# Patient Record
Sex: MALE | Race: WHITE | Employment: OTHER | URBAN - METROPOLITAN AREA
[De-identification: names, ages, dates, MRNs, and addresses within clinical notes are randomized per-mention and may not be internally consistent; named-entity substitution may affect disease eponyms.]

---

## 2019-06-09 RX ORDER — SODIUM CHLORIDE 0.9 % (FLUSH) 0.9 %
10 SYRINGE (ML) INJECTION EVERY 12 HOURS SCHEDULED
Status: CANCELLED | OUTPATIENT
Start: 2019-06-09

## 2019-06-09 RX ORDER — SODIUM CHLORIDE 9 MG/ML
INJECTION, SOLUTION INTRAVENOUS CONTINUOUS
Status: CANCELLED | OUTPATIENT
Start: 2019-06-09

## 2019-06-09 RX ORDER — SODIUM CHLORIDE 0.9 % (FLUSH) 0.9 %
10 SYRINGE (ML) INJECTION PRN
Status: CANCELLED | OUTPATIENT
Start: 2019-06-09

## 2019-06-10 NOTE — H&P
[address]                              HISTORY AND PHYSICAL    PATIENT NAME: Shannan Nearing                   :        1950  MED REC NO:   [id number]                            ROOM:  ACCOUNT NO:   [de-identified]                           ADMIT DATE: 2019  PROVIDER:     Moe Short    HISTORY OF PRESENT ILLNESS:  This patient came into the office. He had  a right inguinal hernia repair. He has a recurrent hernia. There is a  mesh in there. We will have to probably remove the current mesh and put  a new one in. This patient has a history of having had TIAs, so he is  on Plavix. He will be off of that for 5 days prior to surgery. He also  takes clonidine and he has a history of seizures. He is on Depakote. He is on Suboxone because he has a previous history of drug abuse. Dr. Javier Conroy is his doctor and he has okayed him to stay on Suboxone until  surgery. He will manage the patient for that and his other issues after  surgery. His mental status is good. His heart and lungs are clear. His abdomen is clear. He has got a bulging right inguinal hernia, it is  reducible. HEENT is normal.  Skin is normal.  He is going to be given  Ancef 2 gm. A Joyner catheter will be inserted and Anesthesia has  already been advised of this patient to be on a Suboxone and he will  okay him staying on it and they will take care of him intraoperatively. ALLERGIES:  He is allergic to TRAMADOL. LABORATORY DATA:  His labs are all okay that have been done already at  [facility], where I was supposed to do the surgery on  2019, and it was canceled for anesthetic reasons of that hospital.  He is back to [facility] [place] and no other major issues with his family or  major allergies except for the TRAMADOL. PHYSICAL EXAMINATION:  His heart and lungs are clear to auscultation.    His heart is synchronous with radial pulse rhythmic and regular. He has  no neurologic other than the history of seizures, cardiac, vascular, or  neurologic issues. He does not smoke. His abdomen has a hernia that is  reducible. Testicles have shown to be normal on preoperative ultrasound of the  scrotum; however, he does know that he may lose the right testicle as a  result of this recurrent surgery and is willing to accept that. He has  been informed of the risks and benefits of the surgery and the chance of  recurrence is higher than it was before and he is willing to undergo the  surgery and signed the consent in the office.         Argelia Story    D: 06/09/2019 10:58:42       T: 06/09/2019 13:47:00     [email or web address]  Job#: [id number]     Doc#: [id number]    CC:

## 2019-06-11 NOTE — PROGRESS NOTES
[facility]                                                                                                                    PRE OP INSTRUCTIONS FOR  Goyo Arenas        Date: 6/11/2019    Date of surgery: 6/12/19   Arrival Time: Hospital will call you between 5pm and 7pm with your final arrival time for surgery    1. Do not eat or drink anything after midnight prior to surgery. This includes no water, chewing gum, mints or ice chips. 2. Take the following medications with a small sip of water on the morning of Surgery:  Atenolol, Depakote, Clonidine, Suboxone     3. Diabetics may take evening dose of insulin but none after midnight. If you feel symptomatic or low blood sugar morning of surgery drink 1-2 ounces of apple juice only. 4. Aspirin, Ibuprofen, Advil, Naproxen, Vitamin E and other Anti-inflammatory products should be stopped  before surgery  as directed by your physician. Take Tylenol only unless instructed otherwise by your surgeon. 5. Check with your Doctor regarding stopping Plavix, Coumadin, Lovenox, Eliquis, Effient, or other blood thinners. 6. Do not smoke,use illicit drugs and do not drink any alcoholic beverages 24 hours prior to surgery. 7. You may brush your teeth the morning of surgery. DO NOT SWALLOW WATER    8. You MUST make arrangements for a responsible adult to take you home after your surgery. You will not be allowed to leave alone or drive yourself home. It is strongly suggested someone stay with you the first 24 hrs. Your surgery will be cancelled if you do not have a ride home. 9. PEDIATRIC PATIENTS ONLY:  A parent/legal guardian must accompany a child scheduled for surgery and plan to stay at the hospital until the child is discharged. Please do not bring other children with you.     10. Please wear simple, loose fitting clothing to the hospital.  Do not bring valuables (money, credit cards, checkbooks, etc.) Do not wear any makeup

## 2019-06-12 ENCOUNTER — HOSPITAL ENCOUNTER (OUTPATIENT)
Age: 69
Setting detail: OUTPATIENT SURGERY
Discharge: HOME | End: 2019-06-12
Attending: SURGERY | Admitting: SURGERY
Payer: MEDICARE

## 2019-06-12 ENCOUNTER — ANESTHESIA EVENT (OUTPATIENT)
Dept: OPERATING ROOM | Age: 69
End: 2019-06-12
Payer: MEDICARE

## 2019-06-12 ENCOUNTER — ANESTHESIA (OUTPATIENT)
Dept: OPERATING ROOM | Age: 69
End: 2019-06-12
Payer: MEDICARE

## 2019-06-12 VITALS
TEMPERATURE: 98.8 F | SYSTOLIC BLOOD PRESSURE: 120 MMHG | RESPIRATION RATE: 2 BRPM | DIASTOLIC BLOOD PRESSURE: 62 MMHG | OXYGEN SATURATION: 99 %

## 2019-06-12 VITALS
RESPIRATION RATE: 16 BRPM | WEIGHT: 137 LBS | OXYGEN SATURATION: 98 % | SYSTOLIC BLOOD PRESSURE: 198 MMHG | HEIGHT: 65 IN | TEMPERATURE: 97.2 F | HEART RATE: 67 BPM | DIASTOLIC BLOOD PRESSURE: 81 MMHG | BODY MASS INDEX: 22.82 KG/M2

## 2019-06-12 DIAGNOSIS — R56.9 SEIZURE (HCC): Primary | ICD-10-CM

## 2019-06-12 DIAGNOSIS — F19.11 HISTORY OF DRUG ABUSE (HCC): ICD-10-CM

## 2019-06-12 LAB
AMPHETAMINE SCREEN, URINE: NOT DETECTED
BARBITURATE SCREEN URINE: NOT DETECTED
BENZODIAZEPINE SCREEN, URINE: NOT DETECTED
CANNABINOID SCREEN URINE: POSITIVE
COCAINE METABOLITE SCREEN URINE: NOT DETECTED
METHADONE SCREEN, URINE: NOT DETECTED
OPIATE SCREEN URINE: NOT DETECTED
PHENCYCLIDINE SCREEN URINE: NOT DETECTED
PROPOXYPHENE SCREEN: NOT DETECTED

## 2019-06-12 PROCEDURE — 6360000002 HC RX W HCPCS: Performed by: NURSE ANESTHETIST, CERTIFIED REGISTERED

## 2019-06-12 PROCEDURE — 80307 DRUG TEST PRSMV CHEM ANLYZR: CPT

## 2019-06-12 PROCEDURE — 2580000003 HC RX 258: Performed by: ANESTHESIOLOGY

## 2019-06-12 PROCEDURE — C1781 MESH (IMPLANTABLE): HCPCS | Performed by: SURGERY

## 2019-06-12 PROCEDURE — G0480 DRUG TEST DEF 1-7 CLASSES: HCPCS

## 2019-06-12 PROCEDURE — 6360000002 HC RX W HCPCS

## 2019-06-12 PROCEDURE — 64488 TAP BLOCK BI INJECTION: CPT | Performed by: ANESTHESIOLOGY

## 2019-06-12 PROCEDURE — 2500000003 HC RX 250 WO HCPCS: Performed by: SURGERY

## 2019-06-12 PROCEDURE — 7100000011 HC PHASE II RECOVERY - ADDTL 15 MIN: Performed by: SURGERY

## 2019-06-12 PROCEDURE — 6360000002 HC RX W HCPCS: Performed by: SURGERY

## 2019-06-12 PROCEDURE — 7100000001 HC PACU RECOVERY - ADDTL 15 MIN: Performed by: SURGERY

## 2019-06-12 PROCEDURE — 88302 TISSUE EXAM BY PATHOLOGIST: CPT

## 2019-06-12 PROCEDURE — 2500000003 HC RX 250 WO HCPCS: Performed by: NURSE ANESTHETIST, CERTIFIED REGISTERED

## 2019-06-12 PROCEDURE — 3700000000 HC ANESTHESIA ATTENDED CARE: Performed by: SURGERY

## 2019-06-12 PROCEDURE — 88304 TISSUE EXAM BY PATHOLOGIST: CPT

## 2019-06-12 PROCEDURE — 2580000003 HC RX 258: Performed by: SURGERY

## 2019-06-12 PROCEDURE — 6360000002 HC RX W HCPCS: Performed by: ANESTHESIOLOGY

## 2019-06-12 PROCEDURE — 7100000000 HC PACU RECOVERY - FIRST 15 MIN: Performed by: SURGERY

## 2019-06-12 PROCEDURE — 3700000001 HC ADD 15 MINUTES (ANESTHESIA): Performed by: SURGERY

## 2019-06-12 PROCEDURE — 3600000004 HC SURGERY LEVEL 4 BASE: Performed by: SURGERY

## 2019-06-12 PROCEDURE — 3600000014 HC SURGERY LEVEL 4 ADDTL 15MIN: Performed by: SURGERY

## 2019-06-12 PROCEDURE — 2500000003 HC RX 250 WO HCPCS

## 2019-06-12 PROCEDURE — 7100000010 HC PHASE II RECOVERY - FIRST 15 MIN: Performed by: SURGERY

## 2019-06-12 DEVICE — MESH HERN W6XL6IN INGUINAL POLYPR MFIL SQ NONABSORBABLE: Type: IMPLANTABLE DEVICE | Site: GROIN | Status: FUNCTIONAL

## 2019-06-12 RX ORDER — ROPIVACAINE HYDROCHLORIDE 5 MG/ML
40 INJECTION, SOLUTION EPIDURAL; INFILTRATION; PERINEURAL ONCE
Status: DISCONTINUED | OUTPATIENT
Start: 2019-06-12 | End: 2019-06-12 | Stop reason: ALTCHOICE

## 2019-06-12 RX ORDER — KETOROLAC TROMETHAMINE 30 MG/ML
30 INJECTION, SOLUTION INTRAMUSCULAR; INTRAVENOUS ONCE
Status: DISCONTINUED | OUTPATIENT
Start: 2019-06-12 | End: 2019-06-12 | Stop reason: ALTCHOICE

## 2019-06-12 RX ORDER — FENTANYL CITRATE 50 UG/ML
INJECTION, SOLUTION INTRAMUSCULAR; INTRAVENOUS PRN
Status: DISCONTINUED | OUTPATIENT
Start: 2019-06-12 | End: 2019-06-12 | Stop reason: SDUPTHER

## 2019-06-12 RX ORDER — MEPERIDINE HYDROCHLORIDE 50 MG/ML
50 INJECTION INTRAMUSCULAR; INTRAVENOUS; SUBCUTANEOUS ONCE
Status: COMPLETED | OUTPATIENT
Start: 2019-06-12 | End: 2019-06-12

## 2019-06-12 RX ORDER — MEPERIDINE HYDROCHLORIDE 25 MG/ML
INJECTION INTRAMUSCULAR; INTRAVENOUS; SUBCUTANEOUS
Status: DISCONTINUED
Start: 2019-06-12 | End: 2019-06-12 | Stop reason: HOSPADM

## 2019-06-12 RX ORDER — ROPIVACAINE HYDROCHLORIDE 5 MG/ML
INJECTION, SOLUTION EPIDURAL; INFILTRATION; PERINEURAL
Status: DISCONTINUED | OUTPATIENT
Start: 2019-06-12 | End: 2019-06-12 | Stop reason: SDUPTHER

## 2019-06-12 RX ORDER — SODIUM CHLORIDE 0.9 % (FLUSH) 0.9 %
10 SYRINGE (ML) INJECTION EVERY 12 HOURS SCHEDULED
Status: DISCONTINUED | OUTPATIENT
Start: 2019-06-12 | End: 2019-06-12 | Stop reason: HOSPADM

## 2019-06-12 RX ORDER — HYDRALAZINE HYDROCHLORIDE 20 MG/ML
INJECTION INTRAMUSCULAR; INTRAVENOUS PRN
Status: DISCONTINUED | OUTPATIENT
Start: 2019-06-12 | End: 2019-06-12 | Stop reason: SDUPTHER

## 2019-06-12 RX ORDER — LIDOCAINE HYDROCHLORIDE 20 MG/ML
INJECTION, SOLUTION INTRAVENOUS PRN
Status: DISCONTINUED | OUTPATIENT
Start: 2019-06-12 | End: 2019-06-12 | Stop reason: SDUPTHER

## 2019-06-12 RX ORDER — MEPERIDINE HYDROCHLORIDE 50 MG/ML
12.5 INJECTION INTRAMUSCULAR; INTRAVENOUS; SUBCUTANEOUS EVERY 5 MIN PRN
Status: DISCONTINUED | OUTPATIENT
Start: 2019-06-12 | End: 2019-06-12 | Stop reason: HOSPADM

## 2019-06-12 RX ORDER — PROPOFOL 10 MG/ML
INJECTION, EMULSION INTRAVENOUS PRN
Status: DISCONTINUED | OUTPATIENT
Start: 2019-06-12 | End: 2019-06-12 | Stop reason: SDUPTHER

## 2019-06-12 RX ORDER — KETOROLAC TROMETHAMINE 10 MG/1
10 TABLET, FILM COATED ORAL EVERY 6 HOURS PRN
Qty: 20 TABLET | Refills: 0 | Status: SHIPPED | OUTPATIENT
Start: 2019-06-12 | End: 2020-06-11

## 2019-06-12 RX ORDER — DEXAMETHASONE SODIUM PHOSPHATE 10 MG/ML
INJECTION, SOLUTION INTRAMUSCULAR; INTRAVENOUS PRN
Status: DISCONTINUED | OUTPATIENT
Start: 2019-06-12 | End: 2019-06-12 | Stop reason: SDUPTHER

## 2019-06-12 RX ORDER — SODIUM CHLORIDE 0.9 % (FLUSH) 0.9 %
10 SYRINGE (ML) INJECTION PRN
Status: DISCONTINUED | OUTPATIENT
Start: 2019-06-12 | End: 2019-06-12 | Stop reason: HOSPADM

## 2019-06-12 RX ORDER — LABETALOL 20 MG/4 ML (5 MG/ML) INTRAVENOUS SYRINGE
Status: COMPLETED
Start: 2019-06-12 | End: 2019-06-12

## 2019-06-12 RX ORDER — KETOROLAC TROMETHAMINE 30 MG/ML
INJECTION, SOLUTION INTRAMUSCULAR; INTRAVENOUS
Status: COMPLETED
Start: 2019-06-12 | End: 2019-06-12

## 2019-06-12 RX ORDER — LABETALOL HYDROCHLORIDE 5 MG/ML
10 INJECTION, SOLUTION INTRAVENOUS ONCE
Status: COMPLETED | OUTPATIENT
Start: 2019-06-12 | End: 2019-06-12

## 2019-06-12 RX ORDER — ONDANSETRON 2 MG/ML
4 INJECTION INTRAMUSCULAR; INTRAVENOUS
Status: DISCONTINUED | OUTPATIENT
Start: 2019-06-12 | End: 2019-06-12 | Stop reason: HOSPADM

## 2019-06-12 RX ORDER — KETAMINE HYDROCHLORIDE 50 MG/ML
INJECTION, SOLUTION, CONCENTRATE INTRAMUSCULAR; INTRAVENOUS PRN
Status: DISCONTINUED | OUTPATIENT
Start: 2019-06-12 | End: 2019-06-12 | Stop reason: SDUPTHER

## 2019-06-12 RX ORDER — GLYCOPYRROLATE 1 MG/5 ML
SYRINGE (ML) INTRAVENOUS PRN
Status: DISCONTINUED | OUTPATIENT
Start: 2019-06-12 | End: 2019-06-12 | Stop reason: SDUPTHER

## 2019-06-12 RX ORDER — ONDANSETRON 2 MG/ML
INJECTION INTRAMUSCULAR; INTRAVENOUS PRN
Status: DISCONTINUED | OUTPATIENT
Start: 2019-06-12 | End: 2019-06-12 | Stop reason: SDUPTHER

## 2019-06-12 RX ORDER — FENTANYL CITRATE 50 UG/ML
25 INJECTION, SOLUTION INTRAMUSCULAR; INTRAVENOUS EVERY 5 MIN PRN
Status: COMPLETED | OUTPATIENT
Start: 2019-06-12 | End: 2019-06-12

## 2019-06-12 RX ORDER — SODIUM CHLORIDE, SODIUM LACTATE, POTASSIUM CHLORIDE, CALCIUM CHLORIDE 600; 310; 30; 20 MG/100ML; MG/100ML; MG/100ML; MG/100ML
INJECTION, SOLUTION INTRAVENOUS CONTINUOUS
Status: DISCONTINUED | OUTPATIENT
Start: 2019-06-12 | End: 2019-06-12 | Stop reason: HOSPADM

## 2019-06-12 RX ORDER — MIDAZOLAM HYDROCHLORIDE 1 MG/ML
INJECTION INTRAMUSCULAR; INTRAVENOUS PRN
Status: DISCONTINUED | OUTPATIENT
Start: 2019-06-12 | End: 2019-06-12 | Stop reason: SDUPTHER

## 2019-06-12 RX ADMIN — SODIUM CHLORIDE, POTASSIUM CHLORIDE, SODIUM LACTATE AND CALCIUM CHLORIDE: 600; 310; 30; 20 INJECTION, SOLUTION INTRAVENOUS at 08:29

## 2019-06-12 RX ADMIN — KETOROLAC TROMETHAMINE 30 MG: 30 INJECTION, SOLUTION INTRAMUSCULAR; INTRAVENOUS at 15:20

## 2019-06-12 RX ADMIN — FENTANYL CITRATE 50 MCG: 50 INJECTION, SOLUTION INTRAMUSCULAR; INTRAVENOUS at 09:15

## 2019-06-12 RX ADMIN — SODIUM CHLORIDE, POTASSIUM CHLORIDE, SODIUM LACTATE AND CALCIUM CHLORIDE: 600; 310; 30; 20 INJECTION, SOLUTION INTRAVENOUS at 07:30

## 2019-06-12 RX ADMIN — FENTANYL CITRATE 25 MCG: 50 INJECTION INTRAMUSCULAR; INTRAVENOUS at 13:35

## 2019-06-12 RX ADMIN — FENTANYL CITRATE 25 MCG: 50 INJECTION INTRAMUSCULAR; INTRAVENOUS at 13:30

## 2019-06-12 RX ADMIN — FENTANYL CITRATE 100 MCG: 50 INJECTION, SOLUTION INTRAMUSCULAR; INTRAVENOUS at 07:38

## 2019-06-12 RX ADMIN — LABETALOL 20 MG/4 ML (5 MG/ML) INTRAVENOUS SYRINGE 10 MG: at 13:35

## 2019-06-12 RX ADMIN — SODIUM CHLORIDE, POTASSIUM CHLORIDE, SODIUM LACTATE AND CALCIUM CHLORIDE: 600; 310; 30; 20 INJECTION, SOLUTION INTRAVENOUS at 10:12

## 2019-06-12 RX ADMIN — FENTANYL CITRATE 25 MCG: 50 INJECTION INTRAMUSCULAR; INTRAVENOUS at 13:25

## 2019-06-12 RX ADMIN — FENTANYL CITRATE 100 MCG: 50 INJECTION, SOLUTION INTRAMUSCULAR; INTRAVENOUS at 08:33

## 2019-06-12 RX ADMIN — SODIUM CHLORIDE, POTASSIUM CHLORIDE, SODIUM LACTATE AND CALCIUM CHLORIDE: 600; 310; 30; 20 INJECTION, SOLUTION INTRAVENOUS at 06:35

## 2019-06-12 RX ADMIN — KETAMINE HYDROCHLORIDE 50 MG: 50 INJECTION INTRAMUSCULAR; INTRAVENOUS at 08:28

## 2019-06-12 RX ADMIN — PROPOFOL 110 MG: 10 INJECTION, EMULSION INTRAVENOUS at 07:38

## 2019-06-12 RX ADMIN — ROPIVACAINE HYDROCHLORIDE 15 ML: 5 INJECTION, SOLUTION EPIDURAL; INFILTRATION; PERINEURAL at 13:25

## 2019-06-12 RX ADMIN — KETOROLAC TROMETHAMINE 30 MG: 30 INJECTION, SOLUTION INTRAMUSCULAR at 15:20

## 2019-06-12 RX ADMIN — ROPIVACAINE HYDROCHLORIDE 40 ML: 5 INJECTION, SOLUTION EPIDURAL; INFILTRATION; PERINEURAL at 13:04

## 2019-06-12 RX ADMIN — FENTANYL CITRATE 50 MCG: 50 INJECTION, SOLUTION INTRAMUSCULAR; INTRAVENOUS at 08:23

## 2019-06-12 RX ADMIN — LIDOCAINE HYDROCHLORIDE 40 MG: 20 INJECTION, SOLUTION INTRAVENOUS at 07:38

## 2019-06-12 RX ADMIN — MIDAZOLAM 2 MG: 1 INJECTION INTRAMUSCULAR; INTRAVENOUS at 07:25

## 2019-06-12 RX ADMIN — MEPERIDINE HYDROCHLORIDE 50 MG: 50 INJECTION, SOLUTION INTRAMUSCULAR; INTRAVENOUS; SUBCUTANEOUS at 13:50

## 2019-06-12 RX ADMIN — FENTANYL CITRATE 100 MCG: 50 INJECTION, SOLUTION INTRAMUSCULAR; INTRAVENOUS at 08:11

## 2019-06-12 RX ADMIN — FENTANYL CITRATE 50 MCG: 50 INJECTION, SOLUTION INTRAMUSCULAR; INTRAVENOUS at 08:29

## 2019-06-12 RX ADMIN — FENTANYL CITRATE 100 MCG: 50 INJECTION, SOLUTION INTRAMUSCULAR; INTRAVENOUS at 09:57

## 2019-06-12 RX ADMIN — FENTANYL CITRATE 100 MCG: 50 INJECTION, SOLUTION INTRAMUSCULAR; INTRAVENOUS at 10:35

## 2019-06-12 RX ADMIN — ONDANSETRON HYDROCHLORIDE 4 MG: 2 INJECTION, SOLUTION INTRAMUSCULAR; INTRAVENOUS at 12:47

## 2019-06-12 RX ADMIN — MEPERIDINE HYDROCHLORIDE 12.5 MG: 50 INJECTION, SOLUTION INTRAMUSCULAR; INTRAVENOUS; SUBCUTANEOUS at 14:20

## 2019-06-12 RX ADMIN — KETAMINE HYDROCHLORIDE 50 MG: 50 INJECTION INTRAMUSCULAR; INTRAVENOUS at 10:02

## 2019-06-12 RX ADMIN — FENTANYL CITRATE 100 MCG: 50 INJECTION, SOLUTION INTRAMUSCULAR; INTRAVENOUS at 11:28

## 2019-06-12 RX ADMIN — Medication 0.2 MG: at 08:28

## 2019-06-12 RX ADMIN — Medication 2 G: at 07:30

## 2019-06-12 RX ADMIN — LABETALOL HYDROCHLORIDE 10 MG: 5 INJECTION, SOLUTION INTRAVENOUS at 13:35

## 2019-06-12 RX ADMIN — DEXAMETHASONE SODIUM PHOSPHATE 10 MG: 10 INJECTION, SOLUTION INTRAMUSCULAR; INTRAVENOUS at 07:59

## 2019-06-12 RX ADMIN — HYDRALAZINE HYDROCHLORIDE 10 MG: 20 INJECTION INTRAMUSCULAR; INTRAVENOUS at 08:47

## 2019-06-12 RX ADMIN — FENTANYL CITRATE 25 MCG: 50 INJECTION INTRAMUSCULAR; INTRAVENOUS at 13:40

## 2019-06-12 RX ADMIN — FENTANYL CITRATE 50 MCG: 50 INJECTION, SOLUTION INTRAMUSCULAR; INTRAVENOUS at 09:20

## 2019-06-12 ASSESSMENT — PULMONARY FUNCTION TESTS
PIF_VALUE: 14
PIF_VALUE: 15
PIF_VALUE: 14
PIF_VALUE: 12
PIF_VALUE: 16
PIF_VALUE: 14
PIF_VALUE: 12
PIF_VALUE: 14
PIF_VALUE: 15
PIF_VALUE: 14
PIF_VALUE: 12
PIF_VALUE: 14
PIF_VALUE: 15
PIF_VALUE: 13
PIF_VALUE: 14
PIF_VALUE: 15
PIF_VALUE: 16
PIF_VALUE: 12
PIF_VALUE: 14
PIF_VALUE: 14
PIF_VALUE: 15
PIF_VALUE: 14
PIF_VALUE: 17
PIF_VALUE: 12
PIF_VALUE: 15
PIF_VALUE: 12
PIF_VALUE: 14
PIF_VALUE: 15
PIF_VALUE: 12
PIF_VALUE: 14
PIF_VALUE: 15
PIF_VALUE: 14
PIF_VALUE: 24
PIF_VALUE: 14
PIF_VALUE: 12
PIF_VALUE: 15
PIF_VALUE: 12
PIF_VALUE: 18
PIF_VALUE: 12
PIF_VALUE: 18
PIF_VALUE: 14
PIF_VALUE: 12
PIF_VALUE: 13
PIF_VALUE: 15
PIF_VALUE: 14
PIF_VALUE: 13
PIF_VALUE: 12
PIF_VALUE: 14
PIF_VALUE: 14
PIF_VALUE: 2
PIF_VALUE: 12
PIF_VALUE: 14
PIF_VALUE: 15
PIF_VALUE: 15
PIF_VALUE: 14
PIF_VALUE: 12
PIF_VALUE: 16
PIF_VALUE: 5
PIF_VALUE: 14
PIF_VALUE: 14
PIF_VALUE: 12
PIF_VALUE: 14
PIF_VALUE: 17
PIF_VALUE: 15
PIF_VALUE: 14
PIF_VALUE: 12
PIF_VALUE: 15
PIF_VALUE: 13
PIF_VALUE: 18
PIF_VALUE: 14
PIF_VALUE: 14
PIF_VALUE: 32
PIF_VALUE: 14
PIF_VALUE: 14
PIF_VALUE: 15
PIF_VALUE: 14
PIF_VALUE: 14
PIF_VALUE: 15
PIF_VALUE: 12
PIF_VALUE: 14
PIF_VALUE: 12
PIF_VALUE: 15
PIF_VALUE: 14
PIF_VALUE: 14
PIF_VALUE: 13
PIF_VALUE: 14
PIF_VALUE: 11
PIF_VALUE: 14
PIF_VALUE: 15
PIF_VALUE: 14
PIF_VALUE: 14
PIF_VALUE: 15
PIF_VALUE: 15
PIF_VALUE: 14
PIF_VALUE: 17
PIF_VALUE: 14
PIF_VALUE: 16
PIF_VALUE: 12
PIF_VALUE: 15
PIF_VALUE: 15
PIF_VALUE: 14
PIF_VALUE: 13
PIF_VALUE: 14
PIF_VALUE: 14
PIF_VALUE: 13
PIF_VALUE: 12
PIF_VALUE: 14
PIF_VALUE: 13
PIF_VALUE: 12
PIF_VALUE: 14
PIF_VALUE: 14
PIF_VALUE: 15
PIF_VALUE: 15
PIF_VALUE: 14
PIF_VALUE: 13
PIF_VALUE: 13
PIF_VALUE: 12
PIF_VALUE: 12
PIF_VALUE: 16
PIF_VALUE: 15
PIF_VALUE: 13
PIF_VALUE: 19
PIF_VALUE: 12
PIF_VALUE: 12
PIF_VALUE: 14
PIF_VALUE: 12
PIF_VALUE: 12
PIF_VALUE: 14
PIF_VALUE: 14
PIF_VALUE: 16
PIF_VALUE: 14
PIF_VALUE: 19
PIF_VALUE: 12
PIF_VALUE: 12
PIF_VALUE: 16
PIF_VALUE: 13
PIF_VALUE: 12
PIF_VALUE: 15
PIF_VALUE: 15
PIF_VALUE: 21
PIF_VALUE: 14
PIF_VALUE: 15
PIF_VALUE: 14
PIF_VALUE: 16
PIF_VALUE: 14
PIF_VALUE: 16
PIF_VALUE: 14
PIF_VALUE: 14
PIF_VALUE: 12
PIF_VALUE: 14
PIF_VALUE: 13
PIF_VALUE: 14
PIF_VALUE: 15
PIF_VALUE: 14
PIF_VALUE: 12
PIF_VALUE: 14
PIF_VALUE: 14
PIF_VALUE: 12
PIF_VALUE: 15
PIF_VALUE: 14
PIF_VALUE: 13
PIF_VALUE: 14
PIF_VALUE: 12
PIF_VALUE: 16
PIF_VALUE: 14
PIF_VALUE: 14
PIF_VALUE: 15
PIF_VALUE: 16
PIF_VALUE: 14
PIF_VALUE: 12
PIF_VALUE: 14
PIF_VALUE: 14
PIF_VALUE: 12
PIF_VALUE: 14
PIF_VALUE: 14
PIF_VALUE: 18
PIF_VALUE: 14
PIF_VALUE: 15
PIF_VALUE: 14
PIF_VALUE: 18
PIF_VALUE: 0
PIF_VALUE: 12
PIF_VALUE: 14
PIF_VALUE: 15
PIF_VALUE: 14
PIF_VALUE: 14
PIF_VALUE: 12
PIF_VALUE: 14
PIF_VALUE: 14
PIF_VALUE: 4
PIF_VALUE: 13
PIF_VALUE: 15
PIF_VALUE: 14
PIF_VALUE: 16
PIF_VALUE: 14
PIF_VALUE: 12
PIF_VALUE: 14
PIF_VALUE: 15
PIF_VALUE: 18
PIF_VALUE: 14
PIF_VALUE: 16
PIF_VALUE: 14
PIF_VALUE: 14
PIF_VALUE: 13
PIF_VALUE: 14
PIF_VALUE: 12
PIF_VALUE: 14
PIF_VALUE: 15
PIF_VALUE: 13
PIF_VALUE: 2
PIF_VALUE: 14
PIF_VALUE: 0
PIF_VALUE: 14
PIF_VALUE: 13
PIF_VALUE: 14
PIF_VALUE: 14
PIF_VALUE: 15
PIF_VALUE: 15
PIF_VALUE: 14
PIF_VALUE: 15
PIF_VALUE: 18
PIF_VALUE: 14
PIF_VALUE: 11
PIF_VALUE: 15
PIF_VALUE: 18
PIF_VALUE: 14
PIF_VALUE: 21
PIF_VALUE: 14
PIF_VALUE: 12
PIF_VALUE: 14
PIF_VALUE: 15
PIF_VALUE: 21
PIF_VALUE: 14
PIF_VALUE: 12
PIF_VALUE: 14
PIF_VALUE: 15
PIF_VALUE: 14
PIF_VALUE: 14
PIF_VALUE: 15
PIF_VALUE: 14
PIF_VALUE: 12
PIF_VALUE: 14
PIF_VALUE: 17
PIF_VALUE: 12
PIF_VALUE: 16
PIF_VALUE: 15
PIF_VALUE: 14
PIF_VALUE: 12
PIF_VALUE: 14
PIF_VALUE: 15
PIF_VALUE: 14
PIF_VALUE: 14
PIF_VALUE: 12
PIF_VALUE: 12
PIF_VALUE: 14
PIF_VALUE: 15
PIF_VALUE: 13
PIF_VALUE: 14
PIF_VALUE: 15
PIF_VALUE: 15
PIF_VALUE: 14
PIF_VALUE: 16
PIF_VALUE: 14
PIF_VALUE: 16
PIF_VALUE: 15
PIF_VALUE: 16
PIF_VALUE: 14
PIF_VALUE: 14
PIF_VALUE: 12
PIF_VALUE: 14
PIF_VALUE: 16
PIF_VALUE: 14

## 2019-06-12 ASSESSMENT — PAIN DESCRIPTION - DESCRIPTORS
DESCRIPTORS: ACHING;SORE
DESCRIPTORS: ACHING;SORE;DISCOMFORT
DESCRIPTORS: ACHING;SORE

## 2019-06-12 ASSESSMENT — PAIN SCALES - GENERAL
PAINLEVEL_OUTOF10: 6
PAINLEVEL_OUTOF10: 8
PAINLEVEL_OUTOF10: 7
PAINLEVEL_OUTOF10: 5
PAINLEVEL_OUTOF10: 0
PAINLEVEL_OUTOF10: 5
PAINLEVEL_OUTOF10: 6
PAINLEVEL_OUTOF10: 8
PAINLEVEL_OUTOF10: 7
PAINLEVEL_OUTOF10: 5
PAINLEVEL_OUTOF10: 6
PAINLEVEL_OUTOF10: 0
PAINLEVEL_OUTOF10: 7
PAINLEVEL_OUTOF10: 5

## 2019-06-12 ASSESSMENT — PAIN DESCRIPTION - PROGRESSION
CLINICAL_PROGRESSION: NOT CHANGED
CLINICAL_PROGRESSION: GRADUALLY IMPROVING

## 2019-06-12 ASSESSMENT — PAIN DESCRIPTION - FREQUENCY
FREQUENCY: INTERMITTENT
FREQUENCY: CONTINUOUS

## 2019-06-12 ASSESSMENT — PAIN DESCRIPTION - LOCATION
LOCATION: SCROTUM
LOCATION: ABDOMEN;SCROTUM
LOCATION: SCROTUM
LOCATION: SCROTUM
LOCATION: ABDOMEN;SCROTUM
LOCATION: SCROTUM
LOCATION: SCROTUM
LOCATION: ABDOMEN;SCROTUM

## 2019-06-12 ASSESSMENT — PAIN DESCRIPTION - ORIENTATION
ORIENTATION: RIGHT;LEFT
ORIENTATION: RIGHT
ORIENTATION: RIGHT

## 2019-06-12 ASSESSMENT — PAIN DESCRIPTION - PAIN TYPE
TYPE: SURGICAL PAIN

## 2019-06-12 ASSESSMENT — PAIN - FUNCTIONAL ASSESSMENT: PAIN_FUNCTIONAL_ASSESSMENT: 0-10

## 2019-06-12 NOTE — PROGRESS NOTES
Spoke with Dr Devora Westbrook regarding pain, past and current history with pain medications, new orders noted

## 2019-06-12 NOTE — PROGRESS NOTES
Went into surgery room with Dr Claire Kruse to do Tap blocks bilat, while pt was under general anesthesia, [phone] time out done with team, and right and left side prepped, 1304 block started on right with the use of ultrasound, pictures taken, completed at 5, Dr moved to left side [id number] and completed 1309, see anesthesia noted for vitals.

## 2019-06-12 NOTE — OP NOTE
Brief Postoperative Note    Karen Araujo is a 81 y.o. male  Pre-operative Diagnosis: recurrent right inguinal  hernia  Post-operative Diagnosis: Same + lipoma of cord  Procedure: Repair of recurrent direct and indirect  Right inguinal  Hernia 2) lysis of massive adhesions 3) excision of previous mersilene mesh    Anesthesia: gen   Surgeons/Assistants: Amaris Zhong     Estimated Blood Loss: 684ME    Complications: none    Specimens: lipoma of cord; mesh    Fluids:2700cc    Urine output:600cc    N-G output:n/a    Condition:stable    Drains:n/a    Packing:n/a    Amaris Zhong  6/12/2019  1:38 PM

## 2019-06-12 NOTE — ANESTHESIA PRE PROCEDURE
Department of Anesthesiology  Preprocedure Note       Name:  Larry Alexis   Age:  81 y.o.  :  1950                                          MRN:  [id number]         Date:  2019      Surgeon: Jami Mccann):  Sara Arteaga MD    Procedure: HERNIA RIGHT  INGUINAL REPAIR WITH MESH (Right )    Medications prior to admission:   Prior to Admission medications    Medication Sig Start Date End Date Taking? Authorizing Provider   buprenorphine-naloxone (SUBOXONE) 8-2 MG FILM SL film Place 1 Film under the tongue 2 times daily   Yes Historical Provider, MD   PROAIR  (80 Base) MCG/ACT inhaler  17  Yes Historical Provider, MD   amitriptyline (ELAVIL) 25 MG tablet Take 100 mg by mouth nightly    Yes Historical Provider, MD   divalproex (DEPAKOTE) 500 MG DR tablet Take 2 tablets by mouth 2 times daily. Patient taking differently: Take 500 mg by mouth 2 times daily  13  Yes Matilda Santillan DO   clopidogrel (PLAVIX) 75 MG tablet Take 1 tablet by mouth daily. 13  Yes Matilda Santillan DO   ibuprofen (ADVIL;MOTRIN) 200 MG tablet Take 200 mg by mouth as needed. Yes Historical Provider, MD   atenolol (TENORMIN) 25 MG tablet Take 25 mg by mouth daily. 11  Yes Historical Provider, MD   clonidine (CATAPRES) 0.1 MG tablet Take 0.1 mg by mouth daily Patient taking 3 times a day instead of 1 11  Yes Historical Provider, MD       Current medications:    Current Facility-Administered Medications   Medication Dose Route Frequency Provider Last Rate Last Dose    sodium chloride flush 0.9 % injection 10 mL  10 mL Intravenous 2 times per day Lori Cuellar MD        sodium chloride flush 0.9 % injection 10 mL  10 mL Intravenous PRN Lori Cuellar MD        lactated ringers infusion   Intravenous Continuous Lori Cuellar MD 10 mL/hr at 19 0635      ceFAZolin (ANCEF) 2 g in dextrose 5 % 100 mL IVPB  2 g Intravenous Once Sara Arteaga MD           Allergies:     Allergies   Allergen Reactions  Ultram [Tramadol Hcl] Other (See Comments)     Disoriented       Problem List:    Patient Active Problem List   Diagnosis Code    Cervical disc bulge   M50.20    Neural foraminal stenosis of cervical spine M99.81    Headache R51    Chronic pain G89.29    Seizure ([facility] [address] R56.9    Leukocytosis D72.829    Clavicle fracture S42.009A    Lactic acidosis E87.2    Vertebral artery occlusion I65.09    Small vessel disease (HCC) I73.9    Tremor R25.1    History of drug abuse Z87.898    Drug overdose T50.901A    Altered mental status R41.82       Past Medical History:        Diagnosis Date    Diastolic CHF ([address] 49/87/89    There is diastolic stage I, EF 44%    Headache(784.0)     Hypertension     Migraine     Seizures ([facility] [address]     has had 2 since surgeries.  Unspecified cerebral artery occlusion with cerebral infarction     no deficits       Past Surgical History:        Procedure Laterality Date    CERVICAL DISCECTOMY  5/23/11    diskectomy & fusion     CERVICAL FUSION      C5-C6    ECHO COMPLETE  11/19/2013         HERNIA REPAIR      NERVE BLOCK  3/5/12    right cervical facet block #1    NERVE BLOCK  03/19/12    right cervical paravertebral facet block #2    NERVE BLOCK  03-26-12    cervical epidural #3    NERVE BLOCK Right 04 22 2013    cervical tranforaminal nerve block #1    NERVE BLOCK  05-01-13    transforaminal nerve block, right cervical #2    NERVE BLOCK Right 6 5 13    cerv transforam #3       Social History:    Social History     Tobacco Use    Smoking status: Never Smoker    Smokeless tobacco: Current User     Types: Chew    Tobacco comment: Chew    Substance Use Topics    Alcohol use: No     Comment: history of alcohol abuse.   no use in 25 yrs                                Ready to quit: Not Answered  Counseling given: Not Answered  Comment: Chew       Vital Signs (Current):   Vitals:    06/11/19 0937 06/12/19 0620   BP:  (!) 178/77   Pulse:  56   Resp:  16   Temp:  97.4 °F (36.3 °C)   TempSrc:  Temporal   SpO2:  98%   Weight: 140 lb (63.5 kg) 137 lb (62.1 kg)   Height: 5' 4.75\" (1.645 m) 5' 5\" (1.651 m)                                              BP Readings from Last 3 Encounters:   06/12/19 (!) 178/77   08/29/17 106/60   06/16/17 (!) 140/73       NPO Status: Time of last liquid consumption: 1900                        Time of last solid consumption: 1900                        Date of last liquid consumption: 06/11/19                        Date of last solid food consumption: 06/11/19    BMI:   Wt Readings from Last 3 Encounters:   06/12/19 137 lb (62.1 kg)   09/28/17 152 lb (68.9 kg)   08/29/17 154 lb (69.9 kg)     Body mass index is 22.8 kg/m². CBC:   Lab Results   Component Value Date    WBC 8.9 10/19/2015    RBC 4.66 10/19/2015    HGB 13.9 10/19/2015    HCT 42.7 10/19/2015    MCV 91.8 10/19/2015    RDW 14.3 10/19/2015     10/19/2015       CMP:   Lab Results   Component Value Date     10/19/2015    K 4.6 10/19/2015     10/19/2015    CO2 23 10/19/2015    BUN 15 10/19/2015    CREATININE 0.7 10/19/2015    GFRAA >60 10/19/2015    LABGLOM >60 10/19/2015    GLUCOSE 115 10/19/2015    GLUCOSE 95 12/12/2011    PROT 7.3 10/19/2015    CALCIUM 9.5 10/19/2015    BILITOT 0.4 10/19/2015    ALKPHOS 53 10/19/2015    AST 17 10/19/2015    ALT 16 10/19/2015       POC Tests: No results for input(s): POCGLU, POCNA, POCK, POCCL, POCBUN, POCHEMO, POCHCT in the last 72 hours.     Coags:   Lab Results   Component Value Date    PROTIME 11.9 05/10/2014    PROTIME 10.4 05/18/2011    INR 1.1 05/10/2014    APTT 24.1 05/10/2014       HCG (If Applicable): No results found for: PREGTESTUR, PREGSERUM, HCG, HCGQUANT     ABGs: No results found for: PHART, PO2ART, IFF5LFX, NLF7HCR, BEART, T4GWCMEQ     Type & Screen (If Applicable):  No results found for: LABABO, [address]    Anesthesia Evaluation  Patient summary reviewed  Airway: Mallampati: IV  TM distance: <3 FB   Neck ROM: limited  Mouth opening: > = 3 FB Dental: normal exam         Pulmonary: breath sounds clear to auscultation                             Cardiovascular:  Exercise tolerance: good (>4 METS),   (+) hypertension: moderate, CHF: diastolic,       ECG reviewed  Rhythm: regular  Rate: normal  Echocardiogram reviewed                  Neuro/Psych:   (+) seizures:, CVA:, neuromuscular disease:, headaches: migraine headaches,             GI/Hepatic/Renal:        (-) liver disease and no renal disease       Endo/Other:                      ROS comment: History of drug abuse  Chronic suboxone use. Abdominal:         (-) obese     Vascular:   + PVD, aortic or cerebral, . Anesthesia Plan      general     ASA 3       Induction: intravenous. BIS  MIPS: Postoperative opioids intended and Prophylactic antiemetics administered. Anesthetic plan and risks discussed with patient. Plan discussed with CRNA.                 Scot Becerra MD   6/12/2019

## 2019-06-12 NOTE — ANESTHESIA PROCEDURE NOTES
Peripheral Block    Patient location during procedure: OR  Staffing  Anesthesiologist: Lacey Aburto MD  Performed: anesthesiologist   Preanesthetic Checklist  Completed: patient identified, site marked, surgical consent, pre-op evaluation, timeout performed, IV checked, risks and benefits discussed, monitors and equipment checked, anesthesia consent given, oxygen available and patient being monitored  Peripheral Block  Patient position: supine  Prep: ChloraPrep  Patient monitoring: continuous pulse ox, cardiac monitor, IV access, frequent blood pressure checks and continuous capnometry  Block type: TAP  Laterality: bilateral  Injection technique: single-shot  Procedures: ultrasound guided  Local infiltration: ropivacaine  Provider prep: sterile gloves  Local infiltration: ropivacaine  Needle  Needle gauge: 20 G  Needle length: 10 cm  Assessment  Injection assessment: negative aspiration for heme, local visualized surrounding nerve on ultrasound and no paresthesia on injection  Slow fractionated injection: yes  Hemodynamics: stable  Reason for block: post-op pain management and at surgeon's request

## 2019-06-13 NOTE — ANESTHESIA POSTPROCEDURE EVALUATION
Department of Anesthesiology  Postprocedure Note    Patient: Selwyn Page  MRN: [id number]  YOB: 1950  Date of evaluation: 6/13/2019  Time:  4:19 PM     Procedure Summary     Date:  06/12/19 Room / Location:  [address] [place] [address] 03 / [facility] OR    Anesthesia Start:  0730 Anesthesia Stop:  2810    Procedure: HERNIA RIGHT  INGUINAL REPAIR WITH MESH (Right Groin) Diagnosis:  (RIGHT INGUINAL HERNIA)    Surgeon:  Warren Woody MD Responsible Provider:  Claudene Saber, MD    Anesthesia Type:  general ASA Status:  3          Anesthesia Type: general    Halle Phase I: Halle Score: 10    Halle Phase II: Halle Score: 10    Last vitals: Reviewed and per EMR flowsheets.        Anesthesia Post Evaluation    Patient location during evaluation: PACU  Patient participation: complete - patient participated  Level of consciousness: awake and alert  Airway patency: patent  Nausea & Vomiting: no vomiting and no nausea  Complications: no  Cardiovascular status: hemodynamically stable  Respiratory status: acceptable  Hydration status: stable

## 2019-06-13 NOTE — OP NOTE
[address]                                OPERATIVE REPORT    PATIENT NAME: Brandon Hernandez                   :        1950  MED REC NO:   [id number]                            ROOM:  ACCOUNT NO:   [de-identified]                           ADMIT DATE: 2019  PROVIDER:     Sharad Forman    DATE OF PROCEDURE:  2019    PREOPERATIVE DIAGNOSIS:  Recurrent right inguinal hernia with previous  mesh repair in place. POSTOPERATIVE DIAGNOSES:  Recurrent direct and indirect right inguinal  hernia with previous mesh in place, massive adhesions, and lipoma of the  cord. SURGEON:  Dr. Sharad Forman. OPERATIVE PROCEDURE:  The patient was brought to the operating room,  placed on the operating table in the supine position, given LMA  anesthesia. After discussion with the anesthesiologist regarding the  patient's intractable neck position where it cannot be hyperextended for  intubation, we decided on doing it that way and he was heavily sedated. A Joyner catheter was placed once it was controlled with his airway. He  received IV antibiotics. We could prep and drape the right groin widely  including the scrotal area. We then went ahead and made an incision in  the previous incision line that had been made 26 years ago by me. It  was a fairly long standard groin incision. It must have been a  difficult dissection at that time. We opened up the entire length of  the incision that was approximately 9 cm long and then extended it  immediate another 0.5 cm. We could slowly got down to the scar tissue  through Ravindra's fascia and then finally found the external oblique  aponeurosis.   We dissected it free as best possible from the pouting  hernia bulge and then opened it for the full length of the incision  through the external ring and then we began dissecting the dense  adhesions of the spermatic cord to the previous Mersilene mesh and then  dissecting the tissues that the mesh have been sewn to dissecting the  mesh off of them, starting at the pubic tubercle where it appeared that  the mesh had torn off and that was where the main hernia defect was  medially. We found the previous suture and the mesh had been torn away  from it, so we then followed that along Tyshawn's ligament going deeper  and deeper laterally, superiorly and posteriorly. We found a couple of  those sutures and freed off them from the Tyshawn's ligament freeing the  mesh away. It was very dense and difficult, and we found it out to the  lateral most end of that mesh. We then dissected around the spermatic  cord very carefully because this is where I had created a new internal  ring and I was very concerned about not damaging the femoral vein where  the mesh was adherent to those tissues there and that he had a big  femoral vein and I could at all times palpate the femoral artery and we  were able to dissect the mesh off of the medial aspect of that vein all  the way down to where it ended on the Tyshawn's ligament. We also found  the inferior epigastric artery and vein and were able to with careful  dissection spare them and save them. As we did this, we took some  tissue down trying to preserve what I could palpate, as what I thought  was the right vas deferens. The artery was not palpable. We could  distinguish it nicely with the Doppler and we skeletonized that cord by  dissecting another pouting bit of fat lateral to it that was coming just  alongside it and this was a lipoma of the cord. It had a narrow trunk,  but it ballooned out to about 2 cm, so we dissected it down to the base  of the mesh internal ring, transected it and tied off with 2-0 chromic  catgut.   Then, we came around and took the mesh off the internal oblique  and transverses muscles going from lateral to medial and dissecting that  off until we came around to where we had mobilized the mesh off the  pubic tubercle. At that point, we were able to free the mesh completely  off. It came off actually I believe in two pieces, and I do not believe  we left any mesh in there. At this point, the floor of the inguinal  canal was severely weakened. We did not get into the peritoneal cavity. There was no actual hernia sac that we could dissect along the spermatic  cord, which had already been skeletonized quite a bit by myself years  earlier, but now even more skeletonized and there is no sac there, so  this was predominantly a direct hernia pouting through medially near the  pubic tubercle and then laterally coming right alongside the cord  through the old internal ring. Once that was done, we then dissected  free the external oblique aponeurosis nicely off the anterior surface of  the internal oblique for at least 3 cm all the way around out to the  pubic tubercle. There we sort of left them attached and we mobilized  the shelving edge of the inguinal ligament way lateral to the internal  ring area at least an 1.5 inch out, may be more. So, we took a 6 x 6  inch piece of Marlex mesh, a different mesh than I had used before. We  soaked it in antibiotic solution and then took 0 Ethibond on heavy _____  needles and began by suturing the pubic tubercle, periosteum to the  lower inner cord of the mesh, taken nice wide bite on that, it was  extremely strong. Once that was done, then we sutured Tyshawn's ligament  to the lower border of the mesh taking the bites 1 cm apart in the  ligament, more medially, right medial to the femoral vein, and to take a  different needle so it would not tear the Tyshawn's ligament, which had  already been used for the previous mesh repair, so I used an SH needle  with the 0 Ethibond. I was able to get two close bites there just  medial to the vein and then through the deepest part of that mesh.   Then  we abandoned that and get a couple of transition sutures going to the  soft tissue just medial to the femoral vein without hitting it and  without compromising the inferior epigastric vessels, took a couple of  bites of that and came up to the shelving edge of the inguinal ligament  and suturing the mesh to that also, and I felt that we had not crimped  the femoral vein at all. I could take a right angle clamp and put it  right alongside of the medial wall of the vein down into the femoral  canal without the mesh impinging on it and I also pressed on the vein  both proximally and distally in that area and I was able to show the  refill from distal to proximal easily. Then, we cut an incision into  the lower border of the mesh about 1 cm long to accommodate the  spermatic cord at the internal ring and this was a new internal ring. We wrapped it around the spermatic cord and then kept detaching it to  the shelving edge of the inguinal ligament going all the way lateral to  the internal ring by about an 1.5 inch as I said, finishing that aspect  of the low border there. We also took another stitch and stitched the  opening in the mesh to self inferior to the internal ring to get some  additional snugness there, so as not to permit another lipoma of the  cord to push through in that area and it allowed us to put the tip of  the clamp and went easily into new internal ring, but not the finger and  we did not compromise the blood supply, we checked it again with the  Doppler. We then went ahead and we cut the upper border of the mesh to  appropriate curvilinear shape starting medially.   We took a nice bite to  the rectus muscle, the internal by transverse abdominis muscles, real  close to the pubic tubercle stitch and through the upper border of the  mesh, took another stitch between the two sutures, between the pubic  tubercle stitch and that stitch, and then we came around suturing the  internal oblique that transverses to the upper border of the mesh taking  long bites there, at least 2-2.5 cm long and deep to the entire muscle  to the upper border of the mesh. It was nice and snug against the  pouting flow of the canal that we had completely undone, and we got  lateral to the internal ring and kept going laterally. We pleated it  medial to the internal ring once and then kept going laterally taking  the bites nice and wide and lateral superolaterally, so that we would  assure he would not get another recurrence to the surrounding muscle let  alone in the previous area of the mesh. Closed all sutures in place  nicely and we tucked the free aspect of the mesh laterally under the  intact lateral aspect of the external oblique aponeurosis and tacked  that down to the muscle, checked everything that look good. There was  no bleeding. We checked the cord again for the blood supply, it was  good with the Doppler. I felt like I still palpated good vas deferens,  pushed the testicle back into the scrotum, not that we had exposed it,  but we had pulled on that cord enough that I felt we had to push the  testicle back. We irrigated with antibiotic solution widely, suctioned  it out, then we closed the external oblique aponeurosis with a running  lock stitch of 0 Ethibond to create a new external ring and to  re-establish that layer, and then we closed the Ravindra's fascia with a  running lock stitch of 3-0 chromic catgut. We closed the skin with  staples, put a sterile dressing with antibiotic ointment and a Telfa,  and then I pulled both testicles well into the scrotum from the outside. He tolerated the procedure well. Sponge and instrument counts were  correct. We removed the Joyner prior to leaving the operating room. He  received 2700 mL of IV crystalloid. The estimated blood loss was about  100 mL. No replacement necessary. Urine output was 600 mL and clear. The incision was at 8:03, the closure at 12:52.   A total of 4 hours and  49 minutes documenting

## 2019-06-16 LAB — CANNABINOIDS CONF, URINE: >500 NG/ML

## 2021-05-14 ENCOUNTER — APPOINTMENT (OUTPATIENT)
Dept: GENERAL RADIOLOGY | Age: 71
End: 2021-05-14
Payer: MEDICARE

## 2021-05-14 ENCOUNTER — HOSPITAL ENCOUNTER (EMERGENCY)
Age: 71
Discharge: HOME | End: 2021-05-14
Payer: MEDICARE

## 2021-05-14 VITALS
HEIGHT: 65 IN | OXYGEN SATURATION: 97 % | BODY MASS INDEX: 23.32 KG/M2 | WEIGHT: 140 LBS | SYSTOLIC BLOOD PRESSURE: 131 MMHG | HEART RATE: 80 BPM | DIASTOLIC BLOOD PRESSURE: 72 MMHG | RESPIRATION RATE: 16 BRPM | TEMPERATURE: 98 F

## 2021-05-14 DIAGNOSIS — S63.501A RIGHT WRIST SPRAIN, INITIAL ENCOUNTER: Primary | ICD-10-CM

## 2021-05-14 PROCEDURE — 73110 X-RAY EXAM OF WRIST: CPT

## 2021-05-14 PROCEDURE — 99212 OFFICE O/P EST SF 10 MIN: CPT

## 2021-05-14 PROCEDURE — L3931 WHFO NONTORSION JOINT PREFAB: HCPCS

## 2021-05-14 ASSESSMENT — PAIN DESCRIPTION - ORIENTATION: ORIENTATION: RIGHT

## 2021-05-14 ASSESSMENT — PAIN DESCRIPTION - DESCRIPTORS: DESCRIPTORS: THROBBING

## 2021-05-14 ASSESSMENT — PAIN DESCRIPTION - PAIN TYPE: TYPE: ACUTE PAIN

## 2021-05-14 NOTE — ED PROVIDER NOTES
[address] [facility]  Department of Emergency Medicine   ED  Encounter Note  Admit Date/RoomTime: 2021  2:16 PM  ED Room:     NAME: Wonda Cockayne  : 1950  MRN: [id number]     Chief Complaint:  Wrist Injury (Pt c/o falling and injuring his R wrist 6 days ago)    History of Present Illness       Wonda Cockayne is a 78 y.o. old male who presents to the emergency department with a complaint of right wrist pain. Patient states about 6 days ago he slipped fell backwards. Did extend out his right hand to catch himself. Patient states he injured his right hand and right wrist at that time. Did apply ice packs at home which helped with the swelling. He now presents with continued pain at the wrist.  Nothing makes it better or worse. Symptoms are moderate in severity. Patient denies any other injury from this fall. Patient is right-handed. ROS   Pertinent positives and negatives are stated within HPI, all other systems reviewed and are negative. Past Medical History:  has a past medical history of Diastolic CHF ([address] VNSRLWOB(116.0), Hypertension, Migraine, Seizures ([address] and Unspecified cerebral artery occlusion with cerebral infarction. Surgical History:  has a past surgical history that includes cervical fusion; Cervical discectomy (11); Nerve Block (3/5/12); Nerve Block (12); Nerve Block (12); Nerve Block (Right, 2013); Nerve Block (13); Nerve Block (Right, 13); Echo Complete (2013); hernia repair; and hernia repair (Right, 2019). Social History:  reports that he has never smoked. His smokeless tobacco use includes chew. He reports current drug use. Drug: Marijuana. He reports that he does not drink alcohol. Family History: family history includes Alcohol Abuse in his brother and brother; COPD in his brother; Cancer in his brother.      Allergies: Ultram [tramadol hcl]    Physical Exam   Oxygen Saturation Interpretation: Normal.        ED Triage Vitals [05/14/21 1417]   BP Temp Temp Source Pulse Resp SpO2 Height Weight   131/72 98 °F (36.7 °C) Infrared 80 16 97 % 5' 5\" (1.651 m) 140 lb (63.5 kg)         General:  NAD. Alert and Oriented. Well-appearing. Skin:  Warm, dry. No rashes. Head:  Normocephalic. Atraumatic. Eyes:  EOMI. Conjunctiva normal.  ENT:  Oral mucosa moist.  Airway patent. Neck:  Supple. Normal ROM. Respiratory:  No respiratory distress. No labored breathing. Lungs clear without rales, rhonchi or wheezing. Cardiovascular:  Regular rate. No peripheral edema. Extremities warm and good color. Extremities: Pain along the ulnar aspect of the right wrist.  There is no swelling. No deformity. Flexion and extension is intact at the right wrist.  Patient can make a full fist and extend all fingers of the right hand. Palpation of the right hand is nontender. 2+ right radial pulse. Back:  Normal ROM. Nontender to palpation. Neuro:  Alert and Oriented to person, place, time and situation. Normal LOC. Moves all extremities. Speech fluent. Psych:  Calm and Cooperative. Normal thought process. Normal judgement. Lab / Imaging Results   (All laboratory and radiology results have been personally reviewed by myself)  Labs:  No results found for this visit on 05/14/21. Imaging: All Radiology results interpreted by Radiologist unless otherwise noted. XR WRIST RIGHT (MIN 3 VIEWS)   Final Result   1. No definite cortical irregularities about the right wrist to suggest an   acute fracture. There is a subtle osseous irregularity in the scaphoid waist   which is possibly chronic. (Suggest dedicated scaphoid view to attempt to definitively exclude fracture.)      2. 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 Patient's pain is on the ulnar aspect of the wrist, not radial.  And he has no snuffbox tenderness. Will not be jacque-raying him.   ED Course / Medical Decision Making   Medications - No data to display     Consult:   none    Procedure(s):   Post splint examination:  Splint has been applied by ED tech and/or Rn. Post-splint check and neuro exam performed by myself. Splint is appropriately applied. Neurovascular intact with good pulse, normal color and warm extremity. Instructions on what to watch for vascular compromise explained to patient and/or family at bedside. Explained to patient and/or family this is a temporary splint and they will need to be reevaluated by Ortho specialty or their PCP. Patient and her family understand they can return to the ED at anytime for any concerns regarding extremity problem and/or splint. MDM:      Imaging was obtained based on low suspicion for fracture / bony abnormality as per history/physical findings. Plan of care/Counseling:  I reviewed today's visit with the patient in addition to providing specific details for the plan of care and counseling regarding the diagnosis and prognosis. Questions are answered at this time and are agreeable with the plan. Assessment      1. Right wrist sprain, initial encounter New Problem     Plan   Disposition:   Discharge home. Patient condition is good    New Medications     New Prescriptions    No medications on file     Electronically signed by GUILLERMO Veras   DD: 5/14/21  **This report was transcribed using voice recognition software. Every effort was made to ensure accuracy; however, inadvertent computerized transcription errors may be present.   END OF ED PROVIDER NOTE       Benigno Veras  05/14/21 0484

## (undated) DEVICE — SOLUTION IV IRRIG POUR BRL 0.9% SODIUM CHL 2F7124

## (undated) DEVICE — SPONGE,PEANUT,XRAY,ST,SM,3/8",5/CARD: Brand: MEDLINE INDUSTRIES, INC.

## (undated) DEVICE — INTENDED FOR TISSUE SEPARATION, AND OTHER PROCEDURES THAT REQUIRE A SHARP SURGICAL BLADE TO PUNCTURE OR CUT.: Brand: BARD-PARKER ® STAINLESS STEEL BLADES

## (undated) DEVICE — GARMENT,MEDLINE,DVT,INT,CALF,MED, GEN2: Brand: MEDLINE

## (undated) DEVICE — TOTAL TRAY, 16FR 10ML SIL FOLEY, URN: Brand: MEDLINE

## (undated) DEVICE — MARKER,SKIN,WI/RULER AND LABELS: Brand: MEDLINE

## (undated) DEVICE — CATHETER URETH 12FR BLLN 5CC LTX HYDRGEL 2 W BARDX LUB F

## (undated) DEVICE — READY WET SKIN SCRUB TRAY-LF: Brand: MEDLINE INDUSTRIES, INC.

## (undated) DEVICE — TOWEL,OR,DSP,ST,BLUE,STD,6/PK,12PK/CS: Brand: MEDLINE

## (undated) DEVICE — SET MAJOR INSTR HOUSE

## (undated) DEVICE — SPONGE,LAP,4"X18",XR,ST,5/PK,40PK/CS: Brand: MEDLINE INDUSTRIES, INC.

## (undated) DEVICE — SYRINGE IRRIG 60ML SFT PLIABLE BLB EZ TO GRP 1 HND USE W/

## (undated) DEVICE — GOWN,SIRUS,NONRNF,SETINSLV,XL,20/CS: Brand: MEDLINE

## (undated) DEVICE — DOUBLE BASIN SET: Brand: MEDLINE INDUSTRIES, INC.

## (undated) DEVICE — COVER,LIGHT HANDLE,FLX,1/PK: Brand: MEDLINE INDUSTRIES, INC.

## (undated) DEVICE — Z DISCONTINUED NO SUB IDED DRAIN PENROSE L12IN 0.25IN USED TO PROMOTE DRNAGE IN OPN

## (undated) DEVICE — MAGNETIC DRAPE: Brand: DEVON

## (undated) DEVICE — SPONGE,LAP,12"X12",XR,ST,5/PK,40PK/CS: Brand: MEDLINE

## (undated) DEVICE — NDL CNTR 40CT FM MAG: Brand: MEDLINE INDUSTRIES, INC.

## (undated) DEVICE — GLOVE ORANGE PI 7 1/2   MSG9075

## (undated) DEVICE — GAUZE,SPONGE,4"X4",16PLY,XRAY,STRL,LF: Brand: MEDLINE

## (undated) DEVICE — Device

## (undated) DEVICE — GAUZE,SPONGE,4"X4",16PLY,STRL,LF,10/TRAY: Brand: MEDLINE

## (undated) DEVICE — PATIENT RETURN ELECTRODE, SINGLE-USE, CONTACT QUALITY MONITORING, ADULT, WITH 9FT CORD, FOR PATIENTS WEIGING OVER 33LBS. (15KG): Brand: MEGADYNE

## (undated) DEVICE — YANKAUER,POOLE TIP,STERILE,50/CS: Brand: MEDLINE

## (undated) DEVICE — TUBING, SUCTION, 1/4" X 10', STRAIGHT: Brand: MEDLINE

## (undated) DEVICE — PENCIL ES L3M BTTN SWCH HOLSTER W/ BLDE ELECTRD EDGE

## (undated) DEVICE — PACK,UNIVERSAL,NO GOWNS: Brand: MEDLINE

## (undated) DEVICE — PAD,NON-ADHERENT,3X8,STERILE,LF,1/PK: Brand: MEDLINE